# Patient Record
Sex: FEMALE | URBAN - METROPOLITAN AREA
[De-identification: names, ages, dates, MRNs, and addresses within clinical notes are randomized per-mention and may not be internally consistent; named-entity substitution may affect disease eponyms.]

---

## 2021-08-10 ENCOUNTER — APPOINTMENT (OUTPATIENT)
Dept: URBAN - METROPOLITAN AREA SURGERY 8 | Age: 31
Setting detail: DERMATOLOGY
End: 2021-08-10

## 2021-08-10 DIAGNOSIS — L64.8 OTHER ANDROGENIC ALOPECIA: ICD-10-CM

## 2021-08-10 DIAGNOSIS — L81.1 CHLOASMA: ICD-10-CM

## 2021-08-10 PROCEDURE — OTHER ADDITIONAL NOTES: OTHER

## 2021-08-10 PROCEDURE — 99204 OFFICE O/P NEW MOD 45 MIN: CPT

## 2021-08-10 PROCEDURE — OTHER SKIN MEDICINALS: OTHER

## 2021-08-10 PROCEDURE — OTHER ORDER TESTS: OTHER

## 2021-08-10 PROCEDURE — OTHER MEDICATION COUNSELING: OTHER

## 2021-08-10 PROCEDURE — OTHER COUNSELING: OTHER

## 2021-08-10 ASSESSMENT — LOCATION DETAILED DESCRIPTION DERM
LOCATION DETAILED: RIGHT LATERAL EYEBROW
LOCATION DETAILED: RIGHT INFERIOR TEMPLE
LOCATION DETAILED: LEFT INFERIOR LATERAL FOREHEAD
LOCATION DETAILED: RIGHT CENTRAL MALAR CHEEK
LOCATION DETAILED: LEFT CENTRAL MALAR CHEEK
LOCATION DETAILED: RIGHT MEDIAL MALAR CHEEK

## 2021-08-10 ASSESSMENT — LOCATION SIMPLE DESCRIPTION DERM
LOCATION SIMPLE: LEFT CHEEK
LOCATION SIMPLE: RIGHT CHEEK
LOCATION SIMPLE: RIGHT TEMPLE
LOCATION SIMPLE: LEFT FOREHEAD
LOCATION SIMPLE: RIGHT EYEBROW

## 2021-08-10 ASSESSMENT — LOCATION ZONE DERM: LOCATION ZONE: FACE

## 2021-08-10 NOTE — PROCEDURE: ADDITIONAL NOTES
Additional Notes: favor androgenic alopecia given appearance and history (mother with hair loss) but will check baseline labs to rule out underlying cause\\n\\nif labs WNL, can discuss starting spironolactone and minoxidil
Render Risk Assessment In Note?: no
Detail Level: Simple
Additional Notes: I counseled the patient regarding the following:\\nSkin Care: Treatment depends on underlying etiology. Pattern hair loss can be treated with minoxidil. Autoimmune forms of hair loss can be treated with intralesional steroids. Antibiotics can be helpful for acneiform eruptions with hair loss. Patients should wear hair loose, and avoid styling with hair relaxers or hot duckworth.\\nExpectations: There are multiple causes of hair loss, including organic, pattern hair loss, temporary hair shedding, auto-immune, inflammatory, external trauma and infections. Long standing hair loss should be worked up with appropriate blood tests (TSH, CBC, Fe studies)\\nContact Office if: Hair loss fails to improve or worsens with treatment.

## 2021-08-10 NOTE — PROCEDURE: SKIN MEDICINALS
Sig: Wash affected areas daily.
Sig: Apply twice daily for 5 days
Sig: Apply nightly to warts nightly under occlusion
Sig: Apply pea sized amount per area at night
Sig: Apply a thin layer to the scar daily
Sig: Apply to affected areas twice daily
Sig: Apply a thin layer to the affected areas daily
Sig: Apply a thin layer to the affected skin twice daily
Product Type (1): Lightening Cream
Sig: Apply a thin layer to the affected areas twice daily
Sig: Apply a thin layer to affected areas twice daily for 6 weeks
Sig: Apply to affected areas on face twice daily
Intro Statement: I recommended the following products:
Lightening Cream: Hydroquinone 8%, Tretinoin 0.025%, Kojic Acid 1%, Niacinamide 4%, Fluocinolone 0.025% Cream
Detail Level: Simple

## 2021-08-10 NOTE — PROCEDURE: ORDER TESTS
Billing Type: Third-Party Bill
Bill For Surgical Tray: no
Expected Date Of Service: 08/10/2021
Performing Laboratory: 0

## 2021-08-10 NOTE — PROCEDURE: MEDICATION COUNSELING
Xelhamiltonz Pregnancy And Lactation Text: This medication is Pregnancy Category D and is not considered safe during pregnancy.  The risk during breast feeding is also uncertain.